# Patient Record
Sex: FEMALE | Race: WHITE | NOT HISPANIC OR LATINO | Employment: STUDENT | URBAN - METROPOLITAN AREA
[De-identification: names, ages, dates, MRNs, and addresses within clinical notes are randomized per-mention and may not be internally consistent; named-entity substitution may affect disease eponyms.]

---
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
## 2025-01-15 ENCOUNTER — OFFICE VISIT (OUTPATIENT)
Dept: PSYCHIATRY | Facility: CLINIC | Age: 20
End: 2025-01-15
Payer: COMMERCIAL

## 2025-01-15 VITALS — DIASTOLIC BLOOD PRESSURE: 59 MMHG | SYSTOLIC BLOOD PRESSURE: 99 MMHG | HEART RATE: 83 BPM

## 2025-01-15 DIAGNOSIS — F90.0 ADHD (ATTENTION DEFICIT HYPERACTIVITY DISORDER), INATTENTIVE TYPE: Primary | ICD-10-CM

## 2025-01-15 DIAGNOSIS — F32.9 MAJOR DEPRESSIVE EPISODE: ICD-10-CM

## 2025-01-15 DIAGNOSIS — G47.00 INSOMNIA, UNSPECIFIED TYPE: ICD-10-CM

## 2025-01-15 DIAGNOSIS — F60.3 BORDERLINE PERSONALITY DISORDER: ICD-10-CM

## 2025-01-15 DIAGNOSIS — F41.9 ANXIETY DISORDER, UNSPECIFIED TYPE: ICD-10-CM

## 2025-01-15 PROCEDURE — 3078F DIAST BP <80 MM HG: CPT | Mod: CPTII,S$GLB,, | Performed by: PSYCHOLOGIST

## 2025-01-15 PROCEDURE — 99999 PR PBB SHADOW E&M-EST. PATIENT-LVL II: CPT | Mod: PBBFAC,,, | Performed by: PSYCHOLOGIST

## 2025-01-15 PROCEDURE — 99214 OFFICE O/P EST MOD 30 MIN: CPT | Mod: S$GLB,,, | Performed by: PSYCHOLOGIST

## 2025-01-15 PROCEDURE — 1159F MED LIST DOCD IN RCRD: CPT | Mod: CPTII,S$GLB,, | Performed by: PSYCHOLOGIST

## 2025-01-15 PROCEDURE — 3074F SYST BP LT 130 MM HG: CPT | Mod: CPTII,S$GLB,, | Performed by: PSYCHOLOGIST

## 2025-01-15 RX ORDER — SERTRALINE HYDROCHLORIDE 100 MG/1
100 TABLET, FILM COATED ORAL NIGHTLY
Qty: 30 TABLET | Refills: 2 | Status: SHIPPED | OUTPATIENT
Start: 2025-01-15 | End: 2025-02-18 | Stop reason: SDUPTHER

## 2025-01-15 RX ORDER — TRAZODONE HYDROCHLORIDE 50 MG/1
50 TABLET ORAL NIGHTLY
Qty: 30 TABLET | Refills: 2 | Status: SHIPPED | OUTPATIENT
Start: 2025-01-15 | End: 2025-02-18 | Stop reason: SDUPTHER

## 2025-01-15 RX ORDER — LISDEXAMFETAMINE DIMESYLATE 10 MG/1
10 CAPSULE ORAL EVERY MORNING
Qty: 30 CAPSULE | Refills: 0 | Status: SHIPPED | OUTPATIENT
Start: 2025-01-15 | End: 2025-02-18 | Stop reason: SINTOL
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
## 2025-01-31 ENCOUNTER — PATIENT MESSAGE (OUTPATIENT)
Dept: PSYCHIATRY | Facility: CLINIC | Age: 20
End: 2025-01-31
Payer: COMMERCIAL

## 2025-02-14 ENCOUNTER — OFFICE VISIT (OUTPATIENT)
Dept: URGENT CARE | Facility: CLINIC | Age: 20
End: 2025-02-14
Payer: COMMERCIAL

## 2025-02-14 VITALS
OXYGEN SATURATION: 97 % | HEART RATE: 109 BPM | HEIGHT: 64 IN | WEIGHT: 120.81 LBS | BODY MASS INDEX: 20.63 KG/M2 | RESPIRATION RATE: 18 BRPM | DIASTOLIC BLOOD PRESSURE: 60 MMHG | SYSTOLIC BLOOD PRESSURE: 115 MMHG | TEMPERATURE: 98 F

## 2025-02-14 DIAGNOSIS — F41.9 ANXIETY: ICD-10-CM

## 2025-02-14 DIAGNOSIS — G44.209 TENSION HEADACHE: Primary | ICD-10-CM

## 2025-02-14 DIAGNOSIS — F90.9 ATTENTION DEFICIT HYPERACTIVITY DISORDER (ADHD), UNSPECIFIED ADHD TYPE: ICD-10-CM

## 2025-02-14 DIAGNOSIS — R11.2 NAUSEA AND VOMITING, UNSPECIFIED VOMITING TYPE: ICD-10-CM

## 2025-02-14 DIAGNOSIS — F32.A DEPRESSION, UNSPECIFIED DEPRESSION TYPE: ICD-10-CM

## 2025-02-14 LAB
B-HCG UR QL: NEGATIVE
BILIRUBIN, UA POC OHS: NEGATIVE
BLOOD, UA POC OHS: ABNORMAL
CLARITY, UA POC OHS: CLEAR
COLOR, UA POC OHS: YELLOW
CTP QC/QA: YES
GLUCOSE, UA POC OHS: NEGATIVE
KETONES, UA POC OHS: NEGATIVE
LEUKOCYTES, UA POC OHS: NEGATIVE
NITRITE, UA POC OHS: NEGATIVE
PH, UA POC OHS: 5.5
PROTEIN, UA POC OHS: NEGATIVE
SPECIFIC GRAVITY, UA POC OHS: 1.02
UROBILINOGEN, UA POC OHS: 0.2

## 2025-02-14 RX ORDER — BUTALBITAL, ACETAMINOPHEN AND CAFFEINE 50; 325; 40 MG/1; MG/1; MG/1
1 TABLET ORAL EVERY 6 HOURS PRN
Qty: 5 TABLET | Refills: 0 | Status: SHIPPED | OUTPATIENT
Start: 2025-02-14

## 2025-02-14 NOTE — PROGRESS NOTES
"Subjective:      Patient ID: Trisha Sotelo is a 19 y.o. female.    Vitals:  height is 5' 4" (1.626 m) and weight is 54.8 kg (120 lb 13 oz). Her oral temperature is 98.3 °F (36.8 °C). Her blood pressure is 115/60 and her pulse is 109. Her respiration is 18 and oxygen saturation is 97%.     Chief Complaint: Nausea    Pt states that she has been a headache,nausea and vomiting.Pt states that she been taking vyvanse and has lost a lot of weight due to taking it.    Patient provider note starts here:  Patient presents with complaints of N/V and headaches which have been ongoing for some time now. She says that she has been feeling this way since she was discharged from the inpatient psychiatric facility last year. She was recently changed from Strattera to Vyvanse and felt that this change exacerbated her symptoms, therefore, she has not been taking the Vyvanse as often.   She reports that she is awoken by nausea around 0683-2697 in the morning which then proceeds to vomiting. Throughout the day, nearly everyday, she developed a headache described as a "squeezing" sensation or as if her skin is being stretched across her forehead.   Denies fevers or UTI like symptoms. She has a follow-up with psychiatry scheduled for 2/18.     Nausea  This is a new problem. Episode onset: 1 month ago. Associated symptoms include congestion, headaches, nausea and vomiting. Pertinent negatives include no abdominal pain, change in bowel habit, chest pain, fever, myalgias, neck pain, numbness, rash, sore throat or swollen glands. Nothing aggravates the symptoms. She has tried nothing for the symptoms. The treatment provided mild relief.       Constitution: Negative for fever.   HENT:  Positive for congestion. Negative for sore throat.    Neck: Negative for neck pain.   Cardiovascular:  Negative for chest pain, palpitations and sob on exertion.   Respiratory:  Negative for chest tightness and wheezing.    Gastrointestinal:  Positive for nausea " and vomiting. Negative for abdominal pain and diarrhea.   Genitourinary:  Negative for dysuria, frequency and urgency.   Musculoskeletal:  Negative for muscle ache.   Skin:  Negative for color change, rash and wound.   Neurological:  Positive for headaches. Negative for numbness and tingling.      Objective:     Physical Exam   Constitutional: She is oriented to person, place, and time. She appears well-developed. She is cooperative.  Non-toxic appearance. She does not appear ill. No distress.   HENT:   Head: Normocephalic and atraumatic.   Ears:   Right Ear: Hearing and external ear normal.   Left Ear: Hearing and external ear normal.   Nose: Nose normal. No mucosal edema, rhinorrhea or nasal deformity. No epistaxis. Right sinus exhibits no maxillary sinus tenderness and no frontal sinus tenderness. Left sinus exhibits no maxillary sinus tenderness and no frontal sinus tenderness.   Mouth/Throat: Uvula is midline, oropharynx is clear and moist and mucous membranes are normal. No trismus in the jaw. Normal dentition. No uvula swelling. No posterior oropharyngeal edema.   Eyes: Conjunctivae and lids are normal. No scleral icterus.   Neck: Trachea normal and phonation normal. Neck supple. No edema present. No erythema present. No neck rigidity present.   Cardiovascular: Normal rate, regular rhythm, normal heart sounds and normal pulses.   Pulmonary/Chest: Effort normal and breath sounds normal. No respiratory distress. She has no decreased breath sounds. She has no rhonchi.   Abdominal: Normal appearance. Soft. There is no abdominal tenderness.   Musculoskeletal: Normal range of motion.         General: No deformity. Normal range of motion.   Neurological: She is alert and oriented to person, place, and time. She exhibits normal muscle tone. Coordination normal.   Skin: Skin is warm, dry, intact, not diaphoretic and not pale.   Psychiatric: Her speech is normal and behavior is normal. Judgment and thought content  normal.   Nursing note and vitals reviewed.      Assessment:     1. Tension headache    2. Anxiety    3. Depression, unspecified depression type    4. Attention deficit hyperactivity disorder (ADHD), unspecified ADHD type    5. Nausea and vomiting, unspecified vomiting type      Results for orders placed or performed in visit on 02/14/25   POCT urine pregnancy    Collection Time: 02/14/25 12:54 PM   Result Value Ref Range    POC Preg Test, Ur Negative Negative     Acceptable Yes    POCT Urinalysis(Instrument)    Collection Time: 02/14/25 12:55 PM   Result Value Ref Range    Color, POC UA Yellow Yellow, Straw, Colorless    Clarity, POC UA Clear Clear    Glucose, POC UA Negative Negative    Bilirubin, POC UA Negative Negative    Ketones, POC UA Negative Negative    Spec Grav POC UA 1.025 1.005 - 1.030    Blood, POC UA Trace-intact (A) Negative    pH, POC UA 5.5 5.0 - 8.0    Protein, POC UA Negative Negative    Urobilinogen, POC UA 0.2 <=1.0    Nitrite, POC UA Negative Negative    WBC, POC UA Negative Negative       Plan:       Tension headache  -     butalbital-acetaminophen-caffeine -40 mg (FIORICET, ESGIC) -40 mg per tablet; Take 1 tablet by mouth every 6 (six) hours as needed for Headaches.  Dispense: 5 tablet; Refill: 0    Anxiety    Depression, unspecified depression type    Attention deficit hyperactivity disorder (ADHD), unspecified ADHD type    Nausea and vomiting, unspecified vomiting type  -     POCT Urinalysis(Instrument)  -     POCT urine pregnancy          Medical Decision Making:   History:   Old Medical Records: I decided to obtain old medical records.  Old Records Summarized: records from clinic visits.  Clinical Tests:   Lab Tests: Ordered and Reviewed  Urgent Care Management:  A. Problem List:   -Acute: Tension headache, nausea and vomiting    -Chronic: ADHD, PTSD, anxiety and depression   B. Differential diagnosis: medication reaction, medication adverse affect, pregnancy,  tension headache, migraine headache   C. Diagnostic Testing Ordered: UA, UPT   D. Diagnostic Testing Considered: None  E. Independent Historians: None  F. Urgent Care Midlevel Independent Results Interpretation: UPT negative, UA without evidence of infection   G. Radiology:  H. Review of Previous Medical Records: Last evaluated by psychiatry on 1/15. She has had 2 no show appointments since then but now scheduled for 2/18.  I. Home Medications Reviewed  J. Social Determinants of Health considered  K. Medical Decision Making and Disposition: Patient presents with complaints of having chronic N/V and headaches. On exam, she is afebrile and nontoxic appearing. I did reach out to psychiatry in regards to the medications that she is on and patient's concern for medication adverse effects causing this. Dr. Doyle advised that patient can discontinue the Vyvanse if she feels that this is exacerbating her symptoms. I prescribed a short course of Fioricet for patient to try. She has Zofran at home to take PRN. Advised that she follow-up with psychiatry early next week as planned. ED precautions discussed, she verbalized understanding and agreed with plan.            Patient Instructions   Thank you for choosing [facility]!     Our goal in the Urgent Care is to always provide outstanding medical care. You may receive a survey by mail or e-mail in the next week regarding your experience today. We would greatly appreciate you completing and returning the survey. Your feedback provides us with a way to recognize our staff who provide very good care, and it helps us learn how to improve when your experience was below our aspiration of excellence.       We appreciate you trusting us with your medical care. We hope you feel better soon. We will be happy to take care of you for all of your future medical needs.    You must understand that you've received an Urgent Care treatment only and that you may be released before  all your medical problems are known or treated. You, the patient, will arrange for follow up care as instructed.      Follow up with your PCP or specialty clinic as instructed in the next 2-3 days if not improved or as needed. You can call [phone] to schedule an appointment with appropriate provider.      If you condition worsens, we recommend that you receive another evaluation at the emergency room immediately or contact your primary medical clinic's after hours call service to discuss your concerns.      Please return here or go to the Emergency Department for any concerns or worsening condition.

## 2025-02-14 NOTE — PATIENT INSTRUCTIONS

## 2025-02-14 NOTE — LETTER
February 14, 2025      [facility]  [address] [address], [address]  [place] [address]  Phone: [phone]  Fax: [phone]       Patient: Trisha Sotelo   YOB: 2005  Date of Visit: 02/14/2025    To Whom It May Concern:    Kathi Sotelo  was at [facility] on 02/14/2025. She may return to work/school on 2/16/2025 with no restrictions. If you have any questions or concerns, or if I can be of further assistance, please do not hesitate to contact me.    Sincerely,    Radha Fonseca PA-C

## 2025-02-18 ENCOUNTER — OFFICE VISIT (OUTPATIENT)
Dept: PSYCHIATRY | Facility: CLINIC | Age: 20
End: 2025-02-18
Payer: COMMERCIAL

## 2025-02-18 DIAGNOSIS — F32.9 MAJOR DEPRESSIVE EPISODE: ICD-10-CM

## 2025-02-18 DIAGNOSIS — G44.321 INTRACTABLE CHRONIC POST-TRAUMATIC HEADACHE: ICD-10-CM

## 2025-02-18 DIAGNOSIS — F41.9 ANXIETY DISORDER, UNSPECIFIED TYPE: ICD-10-CM

## 2025-02-18 DIAGNOSIS — F90.0 ADHD (ATTENTION DEFICIT HYPERACTIVITY DISORDER), INATTENTIVE TYPE: ICD-10-CM

## 2025-02-18 DIAGNOSIS — G47.00 INSOMNIA, UNSPECIFIED TYPE: ICD-10-CM

## 2025-02-18 DIAGNOSIS — F60.3 BORDERLINE PERSONALITY DISORDER: Primary | ICD-10-CM

## 2025-02-18 RX ORDER — SERTRALINE HYDROCHLORIDE 100 MG/1
100 TABLET, FILM COATED ORAL NIGHTLY
Qty: 30 TABLET | Refills: 2 | Status: SHIPPED | OUTPATIENT
Start: 2025-02-18 | End: 2025-05-19

## 2025-02-18 RX ORDER — TRAZODONE HYDROCHLORIDE 50 MG/1
50 TABLET ORAL NIGHTLY
Qty: 30 TABLET | Refills: 2 | Status: SHIPPED | OUTPATIENT
Start: 2025-02-18 | End: 2025-05-19

## 2025-02-18 RX ORDER — GUANFACINE 1 MG/1
1 TABLET ORAL NIGHTLY
Qty: 30 TABLET | Refills: 2 | Status: SHIPPED | OUTPATIENT
Start: 2025-02-18 | End: 2025-05-19
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
## 2025-03-23 ENCOUNTER — OFFICE VISIT (OUTPATIENT)
Dept: URGENT CARE | Facility: CLINIC | Age: 20
End: 2025-03-23
Payer: COMMERCIAL

## 2025-03-23 VITALS
TEMPERATURE: 99 F | BODY MASS INDEX: 20.49 KG/M2 | OXYGEN SATURATION: 100 % | SYSTOLIC BLOOD PRESSURE: 99 MMHG | WEIGHT: 120 LBS | HEIGHT: 64 IN | RESPIRATION RATE: 20 BRPM | HEART RATE: 77 BPM | DIASTOLIC BLOOD PRESSURE: 70 MMHG

## 2025-03-23 DIAGNOSIS — S61.258A ANIMAL BITE OF INDEX FINGER, INITIAL ENCOUNTER: ICD-10-CM

## 2025-03-23 DIAGNOSIS — S69.91XA FINGERNAIL INJURY, RIGHT, INITIAL ENCOUNTER: Primary | ICD-10-CM

## 2025-03-23 PROCEDURE — 90471 IMMUNIZATION ADMIN: CPT | Mod: S$GLB,,, | Performed by: NURSE PRACTITIONER

## 2025-03-23 PROCEDURE — 99213 OFFICE O/P EST LOW 20 MIN: CPT | Mod: 25,S$GLB,, | Performed by: NURSE PRACTITIONER

## 2025-03-23 PROCEDURE — 90715 TDAP VACCINE 7 YRS/> IM: CPT | Mod: S$GLB,,, | Performed by: NURSE PRACTITIONER

## 2025-03-23 RX ORDER — HYDROCODONE BITARTRATE AND ACETAMINOPHEN 5; 325 MG/1; MG/1
1 TABLET ORAL EVERY 6 HOURS PRN
Qty: 28 TABLET | Refills: 0 | Status: SHIPPED | OUTPATIENT
Start: 2025-03-23 | End: 2025-03-30

## 2025-03-23 RX ORDER — MUPIROCIN 20 MG/G
OINTMENT TOPICAL 2 TIMES DAILY
Qty: 22 G | Refills: 1 | Status: SHIPPED | OUTPATIENT
Start: 2025-03-23 | End: 2025-03-30

## 2025-03-23 RX ORDER — AMOXICILLIN AND CLAVULANATE POTASSIUM 875; 125 MG/1; MG/1
1 TABLET, FILM COATED ORAL 2 TIMES DAILY
Qty: 20 TABLET | Refills: 0 | Status: SHIPPED | OUTPATIENT
Start: 2025-03-23

## 2025-03-23 NOTE — PATIENT INSTRUCTIONS
You have been provided a referral to Hand Surgery.  Please call [phone] to schedule an appointment at your convenience.    -----    If your condition worsens or fails to improve, we recommend that you receive another evaluation at the ER immediately, contact your PCP to discuss your concerns, or return here.  You must understand that you've received an urgent care treatment only, and that you may be released before all your medical problems are known or treated.  You, the patient, will arrange for follow-up care as instructed.     If you were prescribed a narcotic or muscle relaxant, do not drive or operate heavy machinery while taking these medication.    Tylenol or Ibuprofen can also be used as directed for pain unless you have an allergy to them or medical condition (such as stomach ulcers, kidney or liver disease, or use blood thinners, etc.) for which you should not be taking these type of medications.     If you were given a prescription NSAID here, do not also take any over the counter NSAIDs like Ibuprofen, Aleve, Advil, Motrin, etc.  RICE (rest, ice, compression, and elevation) are helpful, as well as gentle stretching, unless otherwise directed.     If you have low back pain and develop bowel or bladder symptoms or increased pain going down your legs, go to the ER immediately.     If you were given a splint, wear it at all times.  If you were given crutches, use them as instructed.  Do not rest your armpits on the foam pad, or you risk compressing the nerves and the vessels there.

## 2025-03-23 NOTE — PROGRESS NOTES
"Subjective:      Patient ID: Trisha Sotelo is a 19 y.o. female.    Vitals:  height is 5' 4" (1.626 m) and weight is 54.4 kg (120 lb). Her oral temperature is 98.8 °F (37.1 °C). Her blood pressure is 99/70 and her pulse is 77. Her respiration is 20 and oxygen saturation is 100%.     Chief Complaint: Hand Pain    18yo female pt reports that her dog bit her hand earlier today (approx 1.5 hrs prior to HX) and ripped off both her artificial acrylic nail and the natural nail underneath it.  Denies breakage to nail, reports that entire nail was removed.  Reports bleeding to area and severe pain (rates now at 8/10, worse with palpation), reports that she did not cleanse the area before coming to .  Reports that her dog is fully vaccinated.  Last Tdap 2016.    Hand Pain   Her dominant hand is their right hand. The incident occurred less than 1 hour ago. The incident occurred at home. The pain is present in the right hand and right fingers. The quality of the pain is described as aching. The pain radiates to the right hand. The pain is at a severity of 8/10. The pain is severe. The pain has been Constant since the incident. Pertinent negatives include no numbness. The symptoms are aggravated by movement, lifting and palpation. She has tried nothing for the symptoms. The treatment provided no relief.       Constitution: Negative for chills and fever.   Musculoskeletal:  Positive for pain, trauma and joint pain. Negative for joint swelling and abnormal ROM of joint.   Skin:  Positive for wound and avulsion.   Neurological:  Negative for numbness and tingling.      Objective:     Physical Exam   Constitutional: She is oriented to person, place, and time. She appears well-developed. She is cooperative.  Non-toxic appearance. She does not appear ill. No distress.   HENT:   Head: Normocephalic and atraumatic. Head is without abrasion, without contusion and without laceration.   Ears:   Right Ear: Hearing, tympanic membrane, " external ear and ear canal normal. No hemotympanum.   Left Ear: Hearing, tympanic membrane, external ear and ear canal normal. No hemotympanum.   Nose: Nose normal. No mucosal edema, rhinorrhea or nasal deformity. No epistaxis. Right sinus exhibits no maxillary sinus tenderness and no frontal sinus tenderness. Left sinus exhibits no maxillary sinus tenderness and no frontal sinus tenderness.   Mouth/Throat: Uvula is midline, oropharynx is clear and moist and mucous membranes are normal. No trismus in the jaw. Normal dentition. No uvula swelling. No posterior oropharyngeal erythema.   Eyes: Conjunctivae, EOM and lids are normal. Pupils are equal, round, and reactive to light. Right eye exhibits no discharge. Left eye exhibits no discharge. No scleral icterus.   Neck: Trachea normal and phonation normal. Neck supple. No tracheal deviation present. No neck rigidity present. No spinous process tenderness present. No muscular tenderness present.   Cardiovascular: Normal rate, regular rhythm, normal heart sounds and normal pulses.   Pulmonary/Chest: Effort normal and breath sounds normal. No respiratory distress.   Abdominal: Normal appearance and bowel sounds are normal. She exhibits no distension and no mass. Soft. There is no abdominal tenderness.   Musculoskeletal: Normal range of motion.         General: No deformity. Normal range of motion.      Right hand: She exhibits normal capillary refill. Decreased sensation is not present in the ulnar distribution, is not present in the medial distribution and is not present in the radial distribution. Right index finger: Exhibits bleeding and deformity (entire R index finger nail avulsed w/ erythematous bleeding nailbed underneath [bleeding stopped after pressure], no foreign matter or nail bed pieces noted after soaking and irrigation). There is tenderness of the DIP joint. Motor /Testing: Index Finger: 5/5.   Neurological: She is alert and oriented to person, place, and  time. She has normal strength. No cranial nerve deficit or sensory deficit. She exhibits normal muscle tone. She displays no seizure activity. Coordination normal. GCS eye subscore is 4. GCS verbal subscore is 5. GCS motor subscore is 6.   Skin: Skin is warm, dry, intact, not diaphoretic and not pale. Capillary refill takes less than 2 seconds. No abrasion, No burn, No bruising and No ecchymosis   Psychiatric: Her speech is normal and behavior is normal. Judgment and thought content normal.   Nursing note and vitals reviewed.          Assessment:     1. Fingernail injury, right, initial encounter    2. Animal bite of index finger, initial encounter        Plan:     Fully irrigated wound with sterile saline and iodine, inspected for foreign debris, rinsed with sterile saline, dried, and covered with antibiotic ointment, nonstick dressing, gauze, and elastic pressure dressing.    Provided education on wound care.  Instructed for pt to cleanse area twice daily with antibacterial soap and change pressure dressing with antibiotic ointment and nonadherent dressing until wound closes and dries.  Provided wound care supplies.    Provided referral to Hand Surgery for further evaluation.  Provided  number.    Provided education on prescribed medications.  Provided education on return/ER precautions and urged immediate ER visit if any concerning s/s occur.  Pt verbalized understanding and agreed to plan.      Fingernail injury, right, initial encounter  -     Tdap Vaccine  -     mupirocin (BACTROBAN) 2 % ointment; Apply topically 2 (two) times daily. for 7 days  Dispense: 22 g; Refill: 1  -     HYDROcodone-acetaminophen (NORCO) 5-325 mg per tablet; Take 1 tablet by mouth every 6 (six) hours as needed for Pain.  Dispense: 28 tablet; Refill: 0  -     Ambulatory referral/consult to Hand Surgery    Animal bite of index finger, initial encounter  -     amoxicillin-clavulanate 875-125mg (AUGMENTIN) 875-125 mg per tablet;  Take 1 tablet by mouth 2 (two) times daily.  Dispense: 20 tablet; Refill: 0      Patient Instructions   You have been provided a referral to Hand Surgery.  Please call [phone] to schedule an appointment at your convenience.    -----    If your condition worsens or fails to improve, we recommend that you receive another evaluation at the ER immediately, contact your PCP to discuss your concerns, or return here.  You must understand that you've received an urgent care treatment only, and that you may be released before all your medical problems are known or treated.  You, the patient, will arrange for follow-up care as instructed.     If you were prescribed a narcotic or muscle relaxant, do not drive or operate heavy machinery while taking these medication.    Tylenol or Ibuprofen can also be used as directed for pain unless you have an allergy to them or medical condition (such as stomach ulcers, kidney or liver disease, or use blood thinners, etc.) for which you should not be taking these type of medications.     If you were given a prescription NSAID here, do not also take any over the counter NSAIDs like Ibuprofen, Aleve, Advil, Motrin, etc.  RICE (rest, ice, compression, and elevation) are helpful, as well as gentle stretching, unless otherwise directed.     If you have low back pain and develop bowel or bladder symptoms or increased pain going down your legs, go to the ER immediately.     If you were given a splint, wear it at all times.  If you were given crutches, use them as instructed.  Do not rest your armpits on the foam pad, or you risk compressing the nerves and the vessels there.

## 2025-03-26 ENCOUNTER — PATIENT MESSAGE (OUTPATIENT)
Dept: PSYCHIATRY | Facility: CLINIC | Age: 20
End: 2025-03-26
Payer: COMMERCIAL

## 2025-04-14 ENCOUNTER — PATIENT MESSAGE (OUTPATIENT)
Dept: PSYCHIATRY | Facility: CLINIC | Age: 20
End: 2025-04-14
Payer: COMMERCIAL

## 2025-04-17 ENCOUNTER — OFFICE VISIT (OUTPATIENT)
Dept: URGENT CARE | Facility: CLINIC | Age: 20
End: 2025-04-17
Payer: COMMERCIAL

## 2025-04-17 VITALS
OXYGEN SATURATION: 99 % | BODY MASS INDEX: 20.58 KG/M2 | HEART RATE: 66 BPM | HEIGHT: 64 IN | DIASTOLIC BLOOD PRESSURE: 65 MMHG | TEMPERATURE: 99 F | RESPIRATION RATE: 19 BRPM | WEIGHT: 120.56 LBS | SYSTOLIC BLOOD PRESSURE: 108 MMHG

## 2025-04-17 DIAGNOSIS — G44.209 TENSION HEADACHE: Primary | ICD-10-CM

## 2025-04-17 PROCEDURE — 99214 OFFICE O/P EST MOD 30 MIN: CPT | Mod: S$GLB,,, | Performed by: PHYSICIAN ASSISTANT

## 2025-04-17 RX ORDER — METHOCARBAMOL 750 MG/1
750 TABLET, FILM COATED ORAL 3 TIMES DAILY
Qty: 15 TABLET | Refills: 0 | Status: SHIPPED | OUTPATIENT
Start: 2025-04-17

## 2025-04-17 NOTE — PROGRESS NOTES
"Subjective:      Patient ID: Trisha Sotelo is a 20 y.o. female.    Vitals:  height is 5' 4" (1.626 m) and weight is 54.7 kg (120 lb 9.5 oz). Her oral temperature is 98.5 °F (36.9 °C). Her blood pressure is 108/65 and her pulse is 66. Her respiration is 19 and oxygen saturation is 99%.     Chief Complaint: Headache    Pt states that she has been having headaches everyday for 4 months.Pt has been taking ibuprofen for pain with no relief.Pt states that she feels like she's being punched in the back of her head and has a lot of pressure in the front of her head.    Patient provider note starts here:  Patient presents here with her friend for complaints of a headache. Reports that she has had a headache nearly everyday for the past 4 months. Feels that the skin across her forehead is being stretched and also has pain in the back of the head/neck (worsened on the right side) which is described as "someone punching" her. Reports associated blurry vision and nausea at times. Has taken Ibuprofen without significant relief. Denies numbness or tingling in the extremities or focal weakness.     Headache   This is a new problem. Episode onset: 4 months ago. The problem occurs constantly. The problem has been gradually worsening. The pain is located in the Frontal region. The pain radiates to the face, right shoulder and right neck. The pain quality is similar to prior headaches. The quality of the pain is described as squeezing and pulsating. The pain is at a severity of 8/10. The pain is moderate. Associated symptoms include blurred vision, dizziness, eye watering, nausea, tingling and a visual change. Pertinent negatives include no abdominal pain, abnormal behavior, drainage, eye pain, eye redness, fever, hearing loss, loss of balance, neck pain, numbness, sinus pressure, sore throat, vomiting or weakness. Nothing aggravates the symptoms. Treatments tried: otc meds. The treatment provided no relief. Her past medical history is " significant for cluster headaches and migraines in the family. There is no history of hypertension, migraine headaches, obesity or sinus disease.       Constitution: Negative for fever.   HENT:  Negative for hearing loss, sinus pressure and sore throat.    Neck: Negative for neck pain.   Cardiovascular:  Negative for chest pain, palpitations and sob on exertion.   Eyes:  Positive for blurred vision. Negative for eye pain and eye redness.   Respiratory:  Negative for chest tightness and wheezing.    Gastrointestinal:  Positive for nausea. Negative for abdominal pain, vomiting and diarrhea.   Skin:  Negative for color change and wound.   Neurological:  Positive for dizziness and headaches. Negative for facial drooping, speech difficulty, loss of balance, history of migraines, numbness and tingling.      Objective:     Physical Exam   Constitutional: She is oriented to person, place, and time. She appears well-developed.  Non-toxic appearance. She does not appear ill. She appears distressed (tearful on exam).   HENT:   Head: Normocephalic and atraumatic.   Ears:   Right Ear: Hearing and external ear normal.   Left Ear: Hearing and external ear normal.   Nose: Nose normal. No mucosal edema, rhinorrhea or nasal deformity. No epistaxis. Right sinus exhibits no maxillary sinus tenderness and no frontal sinus tenderness. Left sinus exhibits no maxillary sinus tenderness and no frontal sinus tenderness.   Mouth/Throat: Uvula is midline, oropharynx is clear and moist and mucous membranes are normal. No trismus in the jaw. Normal dentition. No uvula swelling. No posterior oropharyngeal erythema.   Eyes: Conjunctivae, EOM and lids are normal. Pupils are equal, round, and reactive to light. No scleral icterus. Extraocular movement intact   Neck: Trachea normal and phonation normal. Neck supple. No neck rigidity present.   Cardiovascular: Normal rate, regular rhythm, normal heart sounds and normal pulses.   Pulmonary/Chest:  Effort normal and breath sounds normal. No respiratory distress.   Abdominal: Normal appearance.   Musculoskeletal: Normal range of motion.         General: No deformity. Normal range of motion.      Cervical back: She exhibits tenderness (TTP over the bilateral cervical paraspinous muscles. There is no midline tenderness. FROM of the neck and bilateral UE.).   Neurological: no focal deficit. She is alert and oriented to person, place, and time. She has normal motor skills. No cranial nerve deficit. She exhibits normal muscle tone. Gait and coordination normal. Coordination normal. GCS eye subscore is 4. GCS verbal subscore is 5. GCS motor subscore is 6.   Skin: Skin is warm, dry, intact, not diaphoretic and not pale. Capillary refill takes less than 2 seconds.   Psychiatric: Her speech is normal and behavior is normal. Judgment and thought content normal.   Nursing note and vitals reviewed.      Assessment:     1. Tension headache        Plan:       Tension headache  -     Ambulatory referral/consult to Neurology  -     methocarbamoL (ROBAXIN) 750 MG Tab; Take 1 tablet (750 mg total) by mouth 3 (three) times daily.  Dispense: 15 tablet; Refill: 0          Medical Decision Making:   History:   Old Medical Records: I decided to obtain old medical records.  Old Records Summarized: records from clinic visits.  Urgent Care Management:  A. Problem List:   -Acute: Tension headache    -Chronic: anxiety and depression   B. Differential diagnosis: Migraine headache, cluster headache, tension headache eye strain, and infectious causes such as meningitis, pharyngitis and sinusitis, other dangerous causes such as subarachnoid hemorrhage, tumor  C. Diagnostic Testing Ordered: None  D. Diagnostic Testing Considered: None  E. Independent Historians: None  F. Urgent Care Midlevel Independent Results Interpretation:   G. Radiology:  H. Review of Previous Medical Records: Patient evaluated here by myself for similar symptoms in Feb.  Was referred to neurology but did not complete the visit due to work. At that visit, she reported that she was concerned that the daily headaches were related to the Vyvanse that she was prescribed.   I. Home Medications Reviewed  J. Social Determinants of Health considered  K. Medical Decision Making and Disposition: Patient presents with complaints of a headache nearly daily for the past few months now. On exam, she is afebrile and nontoxic appearing but tearful due to the pain. She is NV intact. According to HPI and exam findings, I feel that the cause for her pain is likely tension from her neck and shoulder regions. I felt the same at her visit here in Feb for the same. Given ED precautions today and discharged with a muscle relaxer and advised to follow-up with PCP and neurology. She declined Toradol IM in clinic today. She verbalized understanding and agreed with plan.          Patient Instructions   Thank you for choosing [facility]!     Our goal in the Urgent Care is to always provide outstanding medical care. You may receive a survey by mail or e-mail in the next week regarding your experience today. We would greatly appreciate you completing and returning the survey. Your feedback provides us with a way to recognize our staff who provide very good care, and it helps us learn how to improve when your experience was below our aspiration of excellence.       We appreciate you trusting us with your medical care. We hope you feel better soon. We will be happy to take care of you for all of your future medical needs.    You must understand that you've received an Urgent Care treatment only and that you may be released before all your medical problems are known or treated. You, the patient, will arrange for follow up care as instructed.      Follow up with your PCP or specialty clinic as instructed in the next 2-3 days if not improved or as needed. You can call [phone] to schedule an appointment  with appropriate provider.      If you condition worsens, we recommend that you receive another evaluation at the emergency room immediately or contact your primary medical clinic's after hours call service to discuss your concerns.      Please return here or go to the Emergency Department for any concerns or worsening condition.

## 2025-04-17 NOTE — PATIENT INSTRUCTIONS

## 2025-04-17 NOTE — LETTER
April 17, 2025      [facility] [place]  [address] [address], [address]  [place] [address]  Phone: [phone]  Fax: [phone]       Patient: Trisha Sotelo   YOB: 2005  Date of Visit: 04/17/2025    To Whom It May Concern:    Kathi Sotelo  was at [facility] on 04/17/2025. She may return to work/school as scheduled with no restrictions. If you have any questions or concerns, or if I can be of further assistance, please do not hesitate to contact me.    Sincerely,    Radha Fonseca PA-C

## 2025-04-28 ENCOUNTER — CLINICAL SUPPORT (OUTPATIENT)
Dept: PSYCHIATRY | Facility: CLINIC | Age: 20
End: 2025-04-28
Payer: COMMERCIAL

## 2025-04-28 DIAGNOSIS — F60.3 BORDERLINE PERSONALITY DISORDER: ICD-10-CM

## 2025-04-28 DIAGNOSIS — F32.9 MAJOR DEPRESSIVE EPISODE: Primary | ICD-10-CM

## 2025-04-28 DIAGNOSIS — F41.9 ANXIETY DISORDER, UNSPECIFIED TYPE: ICD-10-CM

## 2025-04-28 PROCEDURE — 90853 GROUP PSYCHOTHERAPY: CPT | Mod: 95,,,

## 2025-04-28 PROCEDURE — 99499 UNLISTED E&M SERVICE: CPT | Mod: 95,,,

## 2025-04-28 NOTE — PROGRESS NOTES
"[facility] Aftercare Group Therapy  The patient location is: [place]  The chief complaint leading to consultation is:     ICD-10-CM ICD-9-CM   1. Major depressive episode  F32.9 296.20   2. Borderline personality disorder  F60.3 301.83   3. Anxiety disorder, unspecified type  F41.9 300.00     Visit type: audiovisual    Face to Face time with patient: 57  60 minutes of total time spent on the encounter, which includes face to face time and non-face to face time preparing to see the patient (eg, review of tests), Obtaining and/or reviewing separately obtained history, Documenting clinical information in the electronic or other health record, Independently interpreting results (not separately reported) and communicating results to the patient/family/caregiver, or Care coordination (not separately reported).     Each patient to whom he or she provides medical services by telemedicine is:  (1) informed of the relationship between the physician and patient and the respective role of any other health care provider with respect to management of the patient; and (2) notified that he or she may decline to receive medical services by telemedicine and may withdraw from such care at any time.    Notes: N/A  ---  Date: 4/28/2025  Type/Length of service: 49440 (Group Psychotherapy; 60 minutes)  Clinical status of patient: Outpatient  Referred by: [facility]     Number of patients in attendance: 4    Target symptoms: depression, anxiety         Interval History: Patient completed a mindfulness exercise and check-ins. Patient was oriented to DBT Skills training, including the structure and rules of group, as well as the expectations for attendance, participation, and homework practice. Goals of Skills Training, Options for Solving Any Problem, and Biosocial Theory of Emotion Dysregulation were all reviewed.     Response:   Patient reported feeling "nervous and overwhelmed" today. Patient rated current distress (0-10 " scale) as a 3.   Homework was reviewed.   Patient responded to the intervention by engaging in Active Listening, Self Disclosure.  Patient is exhibiting good progress towards goals and other mental status changes.     Diagnosis:     ICD-10-CM ICD-9-CM   1. Major depressive episode  F32.9 296.20   2. Borderline personality disorder  F60.3 301.83   3. Anxiety disorder, unspecified type  F41.9 300.00         Plan: Continue in group psychotherapy. Complete homework assignment.      Return to clinic: 1 week

## 2025-04-29 ENCOUNTER — PATIENT MESSAGE (OUTPATIENT)
Dept: PSYCHIATRY | Facility: CLINIC | Age: 20
End: 2025-04-29
Payer: COMMERCIAL

## 2025-05-19 ENCOUNTER — PATIENT MESSAGE (OUTPATIENT)
Dept: PSYCHIATRY | Facility: CLINIC | Age: 20
End: 2025-05-19
Payer: COMMERCIAL

## 2025-06-03 ENCOUNTER — PATIENT MESSAGE (OUTPATIENT)
Dept: PSYCHIATRY | Facility: CLINIC | Age: 20
End: 2025-06-03
Payer: COMMERCIAL

## 2025-06-12 ENCOUNTER — OFFICE VISIT (OUTPATIENT)
Dept: URGENT CARE | Facility: CLINIC | Age: 20
End: 2025-06-12
Payer: COMMERCIAL

## 2025-06-12 VITALS
HEIGHT: 64 IN | WEIGHT: 120 LBS | DIASTOLIC BLOOD PRESSURE: 74 MMHG | RESPIRATION RATE: 20 BRPM | OXYGEN SATURATION: 98 % | TEMPERATURE: 99 F | HEART RATE: 76 BPM | BODY MASS INDEX: 20.49 KG/M2 | SYSTOLIC BLOOD PRESSURE: 125 MMHG

## 2025-06-12 DIAGNOSIS — L56.4 POLYMORPHIC LIGHT ERUPTION: Primary | ICD-10-CM

## 2025-06-12 PROCEDURE — 99213 OFFICE O/P EST LOW 20 MIN: CPT | Mod: S$GLB,,, | Performed by: SURGERY

## 2025-06-12 RX ORDER — FLUOCINONIDE CREAM (EMULSIFIED BASE) 0.5 MG/G
CREAM TOPICAL 2 TIMES DAILY
Qty: 30 G | Refills: 0 | Status: SHIPPED | OUTPATIENT
Start: 2025-06-12

## 2025-06-12 NOTE — LETTER
June 12, 2025      [facility] [place]  [place] [address]  Phone: [phone]  Fax: [phone]       Patient: Trisha Sotelo   YOB: 2005  Date of Visit: 06/12/2025    To Whom It May Concern:    Kathi Sotelo  was at [facility] on 06/12/2025. The patient may return to work/school on 06/14/2025 with no restrictions. If you have any questions or concerns, or if I can be of further assistance, please do not hesitate to contact me.    Sincerely,      Virginie Griffin MD

## 2025-06-12 NOTE — PROGRESS NOTES
"Subjective:      Patient ID: Trisha Sotelo is a 20 y.o. female.    Vitals:  height is 5' 4" (1.626 m) and weight is 54.4 kg (120 lb). Her oral temperature is 99.1 °F (37.3 °C). Her blood pressure is 125/74 and her pulse is 76. Her respiration is 20 and oxygen saturation is 98%.     Chief Complaint: Rash    Pt present with symptoms of- Rash- that has developed on her chest and progressing to her neck X 4 days. Pt states it seem to develop after she had been in the sun most of the day last Sunday. Pt remembers that she did use a new sunscreen last Sunday. No other exposed or protected areas were affected    Rash  This is a new problem. The current episode started in the past 7 days. The problem has been gradually worsening since onset. The affected locations include the chest and neck. The rash is characterized by itchiness and redness. Associated with: New Sunscreen. Pertinent negatives include no facial edema, fatigue or shortness of breath. Past treatments include topical steroids. The treatment provided no relief. There is no history of asthma or eczema.       Constitution: Negative for fatigue.   Respiratory:  Negative for shortness of breath.    Skin:  Positive for rash. Negative for erythema.      Objective:     Physical Exam   Constitutional: She is oriented to person, place, and time. She appears well-developed.   HENT:   Head: Normocephalic and atraumatic. Head is without abrasion, without contusion and without laceration.   Ears:   Right Ear: External ear normal.   Left Ear: External ear normal.   Nose: Nose normal.   Mouth/Throat: Oropharynx is clear and moist and mucous membranes are normal.   Eyes: Conjunctivae, EOM and lids are normal. Pupils are equal, round, and reactive to light.   Neck: Trachea normal and phonation normal. Neck supple.   Cardiovascular: Normal rate, regular rhythm and normal heart sounds.   Pulmonary/Chest: Effort normal and breath sounds normal. No stridor. No respiratory distress. "   Musculoskeletal: Normal range of motion.         General: Normal range of motion.   Neurological: She is alert and oriented to person, place, and time.   Skin: Skin is warm, dry, intact and no rash. Capillary refill takes less than 2 seconds. No abrasion, No burn, No bruising, No erythema and No ecchymosis        Psychiatric: Her speech is normal and behavior is normal. Judgment and thought content normal.   Nursing note and vitals reviewed.      Assessment:     1. Polymorphic light eruption        Plan:       Polymorphic light eruption  -     fluocinonide-emollient (FLUOCINONIDE-E) 0.05 % Crea; Apply topically 2 (two) times daily.  Dispense: 30 g; Refill: 0        Work note written. Recommended use steroid cream sparingly. OK to take benadryl for itching
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
## 2025-07-02 ENCOUNTER — OFFICE VISIT (OUTPATIENT)
Facility: CLINIC | Age: 20
End: 2025-07-02
Payer: COMMERCIAL

## 2025-07-02 VITALS
HEART RATE: 88 BPM | HEIGHT: 64 IN | SYSTOLIC BLOOD PRESSURE: 98 MMHG | BODY MASS INDEX: 20.49 KG/M2 | DIASTOLIC BLOOD PRESSURE: 68 MMHG | WEIGHT: 120 LBS

## 2025-07-02 DIAGNOSIS — R20.0 NUMBNESS AND TINGLING: ICD-10-CM

## 2025-07-02 DIAGNOSIS — R20.2 NUMBNESS AND TINGLING: ICD-10-CM

## 2025-07-02 DIAGNOSIS — R51.9 MORNING HEADACHE: ICD-10-CM

## 2025-07-02 DIAGNOSIS — F41.9 ANXIETY: ICD-10-CM

## 2025-07-02 DIAGNOSIS — R93.89 ABNORMAL MRI: ICD-10-CM

## 2025-07-02 DIAGNOSIS — G93.5 CHIARI I MALFORMATION: ICD-10-CM

## 2025-07-02 DIAGNOSIS — G43.709 CHRONIC MIGRAINE WITHOUT AURA WITHOUT STATUS MIGRAINOSUS, NOT INTRACTABLE: Primary | ICD-10-CM

## 2025-07-02 DIAGNOSIS — R42 DIZZINESS AND GIDDINESS: ICD-10-CM

## 2025-07-02 PROCEDURE — 3008F BODY MASS INDEX DOCD: CPT | Mod: CPTII,S$GLB,,

## 2025-07-02 PROCEDURE — 1159F MED LIST DOCD IN RCRD: CPT | Mod: CPTII,S$GLB,,

## 2025-07-02 PROCEDURE — 99999 PR PBB SHADOW E&M-EST. PATIENT-LVL III: CPT | Mod: PBBFAC,,,

## 2025-07-02 PROCEDURE — 99204 OFFICE O/P NEW MOD 45 MIN: CPT | Mod: S$GLB,,,

## 2025-07-02 PROCEDURE — 3078F DIAST BP <80 MM HG: CPT | Mod: CPTII,S$GLB,,

## 2025-07-02 PROCEDURE — 3074F SYST BP LT 130 MM HG: CPT | Mod: CPTII,S$GLB,,

## 2025-07-02 RX ORDER — TOPIRAMATE 25 MG/1
50 TABLET, FILM COATED ORAL NIGHTLY
Qty: 60 TABLET | Refills: 2 | Status: SHIPPED | OUTPATIENT
Start: 2025-07-02 | End: 2026-07-02

## 2025-07-07 PROBLEM — G43.709 CHRONIC MIGRAINE WITHOUT AURA WITHOUT STATUS MIGRAINOSUS, NOT INTRACTABLE: Status: ACTIVE | Noted: 2025-07-07

## 2025-07-07 RX ORDER — ONDANSETRON 8 MG/1
8 TABLET, ORALLY DISINTEGRATING ORAL EVERY 8 HOURS
COMMUNITY
Start: 2025-05-20

## 2025-07-21 DIAGNOSIS — F32.9 MAJOR DEPRESSIVE EPISODE: ICD-10-CM

## 2025-07-21 DIAGNOSIS — F41.9 ANXIETY DISORDER, UNSPECIFIED TYPE: ICD-10-CM

## 2025-07-22 RX ORDER — SERTRALINE HYDROCHLORIDE 100 MG/1
100 TABLET, FILM COATED ORAL NIGHTLY
Qty: 30 TABLET | Refills: 2 | Status: SHIPPED | OUTPATIENT
Start: 2025-07-22 | End: 2025-10-20

## 2025-07-25 ENCOUNTER — TELEPHONE (OUTPATIENT)
Dept: NEUROSURGERY | Facility: CLINIC | Age: 20
End: 2025-07-25
Payer: COMMERCIAL

## 2025-07-25 NOTE — TELEPHONE ENCOUNTER
Attempted to call pt 2x to r/s appt with Dr. Baker on 8/6 due to him being on call and unavailable in the clinic. No answer so left VM informing pt we can reschedule to 8/19 or 8/20. Callback number given for pt to return call.

## 2025-07-28 ENCOUNTER — TELEPHONE (OUTPATIENT)
Dept: NEUROSURGERY | Facility: CLINIC | Age: 20
End: 2025-07-28
Payer: COMMERCIAL

## 2025-07-28 NOTE — TELEPHONE ENCOUNTER
Attempted again to call pt 2x to r/s appt with Dr. Baker on 8/6 due to him being on call and unavailable in the clinic. No answer so left VM informing pt we can reschedule to 8/19 or 8/20. Callback number given for pt to return call.

## 2025-07-31 ENCOUNTER — TELEPHONE (OUTPATIENT)
Dept: NEUROSURGERY | Facility: CLINIC | Age: 20
End: 2025-07-31
Payer: COMMERCIAL

## 2025-07-31 NOTE — TELEPHONE ENCOUNTER
Called and spoke with pt. R/s appt with Dr. Baker from 8/6 to 8/20 @ 2 PM due to provider being on call and unavailable in the clinic.

## 2025-08-26 ENCOUNTER — OFFICE VISIT (OUTPATIENT)
Dept: URGENT CARE | Facility: CLINIC | Age: 20
End: 2025-08-26
Payer: COMMERCIAL

## 2025-08-26 VITALS
RESPIRATION RATE: 16 BRPM | OXYGEN SATURATION: 96 % | SYSTOLIC BLOOD PRESSURE: 96 MMHG | TEMPERATURE: 99 F | HEART RATE: 74 BPM | DIASTOLIC BLOOD PRESSURE: 59 MMHG | BODY MASS INDEX: 20.49 KG/M2 | HEIGHT: 64 IN | WEIGHT: 120 LBS

## 2025-08-26 DIAGNOSIS — J02.9 SORE THROAT: ICD-10-CM

## 2025-08-26 DIAGNOSIS — J06.9 VIRAL URI: Primary | ICD-10-CM

## 2025-08-26 LAB
CTP QC/QA: YES
CTP QC/QA: YES
MOLECULAR STREP A: NEGATIVE
SARS-COV+SARS-COV-2 AG RESP QL IA.RAPID: NEGATIVE

## 2025-08-26 PROCEDURE — 87651 STREP A DNA AMP PROBE: CPT | Mod: QW,S$GLB,,
